# Patient Record
Sex: FEMALE | Race: BLACK OR AFRICAN AMERICAN | Employment: FULL TIME | URBAN - METROPOLITAN AREA
[De-identification: names, ages, dates, MRNs, and addresses within clinical notes are randomized per-mention and may not be internally consistent; named-entity substitution may affect disease eponyms.]

---

## 2024-01-04 ENCOUNTER — APPOINTMENT (OUTPATIENT)
Dept: OBSTETRICS AND GYNECOLOGY | Facility: CLINIC | Age: 39
End: 2024-01-04
Payer: COMMERCIAL

## 2024-02-02 ENCOUNTER — LAB (OUTPATIENT)
Dept: LAB | Facility: LAB | Age: 39
End: 2024-02-02
Payer: COMMERCIAL

## 2024-02-02 ENCOUNTER — OFFICE VISIT (OUTPATIENT)
Dept: OBSTETRICS AND GYNECOLOGY | Facility: CLINIC | Age: 39
End: 2024-02-02
Payer: COMMERCIAL

## 2024-02-02 VITALS
WEIGHT: 190 LBS | BODY MASS INDEX: 31.65 KG/M2 | DIASTOLIC BLOOD PRESSURE: 70 MMHG | HEIGHT: 65 IN | SYSTOLIC BLOOD PRESSURE: 114 MMHG | HEART RATE: 56 BPM

## 2024-02-02 DIAGNOSIS — Z00.00 HEALTHCARE MAINTENANCE: ICD-10-CM

## 2024-02-02 DIAGNOSIS — Z12.4 CERVICAL CANCER SCREENING: Primary | ICD-10-CM

## 2024-02-02 LAB
25(OH)D3 SERPL-MCNC: 14 NG/ML (ref 30–100)
ANION GAP SERPL CALC-SCNC: 11 MMOL/L (ref 10–20)
BUN SERPL-MCNC: 10 MG/DL (ref 6–23)
CALCIUM SERPL-MCNC: 9.1 MG/DL (ref 8.6–10.6)
CHLORIDE SERPL-SCNC: 108 MMOL/L (ref 98–107)
CHOLEST SERPL-MCNC: 143 MG/DL (ref 0–199)
CHOLESTEROL/HDL RATIO: 3.1
CO2 SERPL-SCNC: 26 MMOL/L (ref 21–32)
CREAT SERPL-MCNC: 0.79 MG/DL (ref 0.5–1.05)
EGFRCR SERPLBLD CKD-EPI 2021: >90 ML/MIN/1.73M*2
ERYTHROCYTE [DISTWIDTH] IN BLOOD BY AUTOMATED COUNT: 14.2 % (ref 11.5–14.5)
EST. AVERAGE GLUCOSE BLD GHB EST-MCNC: 111 MG/DL
GLUCOSE SERPL-MCNC: 84 MG/DL (ref 74–99)
HBA1C MFR BLD: 5.5 %
HCT VFR BLD AUTO: 40.5 % (ref 36–46)
HDLC SERPL-MCNC: 46.8 MG/DL
HGB BLD-MCNC: 13.5 G/DL (ref 12–16)
IRON SATN MFR SERPL: 22 % (ref 25–45)
IRON SERPL-MCNC: 75 UG/DL (ref 35–150)
MCH RBC QN AUTO: 28.5 PG (ref 26–34)
MCHC RBC AUTO-ENTMCNC: 33.3 G/DL (ref 32–36)
MCV RBC AUTO: 86 FL (ref 80–100)
NON-HDL CHOLESTEROL: 96 MG/DL (ref 0–149)
NRBC BLD-RTO: 0 /100 WBCS (ref 0–0)
PLATELET # BLD AUTO: 318 X10*3/UL (ref 150–450)
POTASSIUM SERPL-SCNC: 4.2 MMOL/L (ref 3.5–5.3)
RBC # BLD AUTO: 4.73 X10*6/UL (ref 4–5.2)
SODIUM SERPL-SCNC: 141 MMOL/L (ref 136–145)
TIBC SERPL-MCNC: 346 UG/DL (ref 240–445)
UIBC SERPL-MCNC: 271 UG/DL (ref 110–370)
WBC # BLD AUTO: 6 X10*3/UL (ref 4.4–11.3)

## 2024-02-02 PROCEDURE — 85027 COMPLETE CBC AUTOMATED: CPT

## 2024-02-02 PROCEDURE — 99214 OFFICE O/P EST MOD 30 MIN: CPT | Performed by: STUDENT IN AN ORGANIZED HEALTH CARE EDUCATION/TRAINING PROGRAM

## 2024-02-02 PROCEDURE — 36415 COLL VENOUS BLD VENIPUNCTURE: CPT

## 2024-02-02 PROCEDURE — 1036F TOBACCO NON-USER: CPT | Performed by: STUDENT IN AN ORGANIZED HEALTH CARE EDUCATION/TRAINING PROGRAM

## 2024-02-02 PROCEDURE — 82465 ASSAY BLD/SERUM CHOLESTEROL: CPT

## 2024-02-02 PROCEDURE — 88141 CYTOPATH C/V INTERPRET: CPT | Performed by: PATHOLOGY

## 2024-02-02 PROCEDURE — 87800 DETECT AGNT MULT DNA DIREC: CPT | Performed by: STUDENT IN AN ORGANIZED HEALTH CARE EDUCATION/TRAINING PROGRAM

## 2024-02-02 PROCEDURE — 83036 HEMOGLOBIN GLYCOSYLATED A1C: CPT

## 2024-02-02 PROCEDURE — 99385 PREV VISIT NEW AGE 18-39: CPT | Performed by: STUDENT IN AN ORGANIZED HEALTH CARE EDUCATION/TRAINING PROGRAM

## 2024-02-02 PROCEDURE — 87661 TRICHOMONAS VAGINALIS AMPLIF: CPT | Mod: 59 | Performed by: STUDENT IN AN ORGANIZED HEALTH CARE EDUCATION/TRAINING PROGRAM

## 2024-02-02 PROCEDURE — 80048 BASIC METABOLIC PNL TOTAL CA: CPT

## 2024-02-02 PROCEDURE — 82306 VITAMIN D 25 HYDROXY: CPT

## 2024-02-02 PROCEDURE — 87624 HPV HI-RISK TYP POOLED RSLT: CPT | Performed by: STUDENT IN AN ORGANIZED HEALTH CARE EDUCATION/TRAINING PROGRAM

## 2024-02-02 PROCEDURE — 88142 CYTOPATH C/V THIN LAYER: CPT | Mod: TC,GCY | Performed by: STUDENT IN AN ORGANIZED HEALTH CARE EDUCATION/TRAINING PROGRAM

## 2024-02-02 PROCEDURE — 83550 IRON BINDING TEST: CPT

## 2024-02-02 PROCEDURE — 83718 ASSAY OF LIPOPROTEIN: CPT

## 2024-02-02 PROCEDURE — 83540 ASSAY OF IRON: CPT

## 2024-02-02 ASSESSMENT — PAIN SCALES - GENERAL: PAINLEVEL: 0-NO PAIN

## 2024-02-02 NOTE — PROGRESS NOTES
"Division of Minimally Invasive Gynecologic Surgery  [facility]    02/02/24 Gynecology Consult     HISTORY OF PRESENT ILLNESS:  Mateo Webertimore 38 y.o. 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  - Denies any verbal or physical abuse     PAST MEDICAL HISTORY:  Past Medical History:   Diagnosis Date    IUD (intrauterine device) in place        PAST SURGICAL HISTORY:  Past Surgical History:   Procedure Laterality Date    BELT ABDOMINOPLASTY      BREAST SURGERY      Reduction    CHOLECYSTECTOMY  03/26/2014    laparoscopic     PHYSICAL EXAMINATION:  VITAL SIGNS: /70   Pulse 56   Ht 1.651 m (5' 5\")   Wt 86.2 kg (190 lb)   LMP 01/07/2024 (Exact Date)   BMI 31.62 kg/m²      Constitutional:  No acute distress, well-nourished and well-developed  HEENT: EOM grossly intact, MMM, neck supple and with full ROM  Pulm:  Effort normal. No accessory muscle usage.  No respiratory distress.  :  - EGBUS: grossly WNL  - Speculum: vaginal and cervical mucosa grossly WNL, no trauma or lesions  Neurological:  She is alert and oriented to person place and time.  Skin: Warm, no pallor.  Psychiatric:  She has normal mood and affect.    ASSESSMENT:  Mateo BISHOP Neli 38 y.o. presents for annual  - Pap collected  - Labs ordered   - GC/CT/trich from pap       Candis Blancas MD  02/02/24  10:20 AM   "

## 2024-02-05 LAB
C TRACH RRNA SPEC QL NAA+PROBE: NEGATIVE
N GONORRHOEA DNA SPEC QL PROBE+SIG AMP: NEGATIVE
T VAGINALIS RRNA SPEC QL NAA+PROBE: NEGATIVE

## 2024-02-15 DIAGNOSIS — E55.9 VITAMIN D DEFICIENCY: Primary | ICD-10-CM

## 2024-02-15 RX ORDER — ASCORBIC ACID 125 MG
25 TABLET,CHEWABLE ORAL DAILY
Qty: 90 EACH | Refills: 3 | Status: SHIPPED | OUTPATIENT
Start: 2024-02-15

## 2024-02-20 LAB
CYTOLOGY CMNT CVX/VAG CYTO-IMP: NORMAL
HPV HR GENOTYPES PNL CVX NAA+PROBE: POSITIVE
LAB AP HPV GENOTYPE QUESTION: NO
LAB AP HPV HR: NORMAL
LAB AP PAP ADDITIONAL TESTS: NORMAL
LABORATORY COMMENT REPORT: NORMAL
LABORATORY COMMENT REPORT: NORMAL
LMP START DATE: NORMAL
PATH REPORT.TOTAL CANCER: NORMAL

## 2024-03-13 ENCOUNTER — TELEPHONE (OUTPATIENT)
Dept: RADIOLOGY | Facility: CLINIC | Age: 39
End: 2024-03-13
Payer: COMMERCIAL

## 2024-03-13 NOTE — TELEPHONE ENCOUNTER
Left  message  ----- Message from Katia Carson RN sent at 3/12/2024  3:26 PM EDT -----    ----- Message -----  From: Candis Blancas MD  Sent: 3/4/2024   5:08 PM EDT  To: Katia Carson RN    Hi! [facility] Thanks!

## 2024-03-14 ENCOUNTER — TELEPHONE (OUTPATIENT)
Dept: OBSTETRICS AND GYNECOLOGY | Facility: CLINIC | Age: 39
End: 2024-03-14
Payer: COMMERCIAL

## 2024-03-14 NOTE — TELEPHONE ENCOUNTER
Patient advised of atypical cells on pap smear and positive HPV with explanation given.  Patient for colposcopy.  Procedure explained.  Appointment at [facility] given for 3/27/24.

## 2024-03-14 NOTE — TELEPHONE ENCOUNTER
----- Message from Katia Carson RN sent at 3/12/2024  3:26 PM EDT -----    ----- Message -----  From: Candis Blancas MD  Sent: 3/4/2024   5:08 PM EDT  To: Katia Carson RN    Hi! [facility] Thanks!

## 2024-03-18 ENCOUNTER — TELEPHONE (OUTPATIENT)
Dept: PRIMARY CARE | Facility: CLINIC | Age: 39
End: 2024-03-18
Payer: COMMERCIAL

## 2024-03-27 ENCOUNTER — PROCEDURE VISIT (OUTPATIENT)
Dept: OBSTETRICS AND GYNECOLOGY | Facility: HOSPITAL | Age: 39
End: 2024-03-27
Payer: COMMERCIAL

## 2024-03-27 VITALS
SYSTOLIC BLOOD PRESSURE: 126 MMHG | WEIGHT: 187 LBS | BODY MASS INDEX: 31.16 KG/M2 | HEIGHT: 65 IN | DIASTOLIC BLOOD PRESSURE: 71 MMHG

## 2024-03-27 DIAGNOSIS — R87.810 ASCUS WITH POSITIVE HIGH RISK HPV CERVICAL: Primary | ICD-10-CM

## 2024-03-27 DIAGNOSIS — R87.610 ASCUS WITH POSITIVE HIGH RISK HPV CERVICAL: Primary | ICD-10-CM

## 2024-03-27 LAB — PREGNANCY TEST URINE, POC: NEGATIVE

## 2024-03-27 PROCEDURE — 81025 URINE PREGNANCY TEST: CPT | Performed by: OBSTETRICS & GYNECOLOGY

## 2024-03-27 PROCEDURE — 57455 BIOPSY OF CERVIX W/SCOPE: CPT | Performed by: STUDENT IN AN ORGANIZED HEALTH CARE EDUCATION/TRAINING PROGRAM

## 2024-03-27 PROCEDURE — 88305 TISSUE EXAM BY PATHOLOGIST: CPT | Performed by: PATHOLOGY

## 2024-03-27 PROCEDURE — 88305 TISSUE EXAM BY PATHOLOGIST: CPT | Mod: TC,SUR | Performed by: OBSTETRICS & GYNECOLOGY

## 2024-03-27 PROCEDURE — 57455 BIOPSY OF CERVIX W/SCOPE: CPT | Mod: GC | Performed by: STUDENT IN AN ORGANIZED HEALTH CARE EDUCATION/TRAINING PROGRAM

## 2024-03-27 PROCEDURE — 81025 URINE PREGNANCY TEST: CPT

## 2024-03-27 NOTE — PROGRESS NOTES
Colposcopy    Date/Time: 3/27/2024 2:42 PM    Performed by: Charley Carolina MD  Authorized by: Roro Rasmussen MD    Consent:     Patient questions answered: yes      Risks and benefits of the procedure and its alternatives discussed: yes      Consent obtained:  Written    Consent given by:  Patient  Indication:     Other indication(s): clinical abnormality    Pre-procedure:     Prep solution(s): acetic acid    Procedure:     Colposcopy with: cervical biopsy      Cervix visibility: fully visualized      SCJ visibility: fully visualized      Acetowhite lesion(s): cervix      Acetowhite lesion(s) description:  12 oclock  Post-procedure:     Patient tolerance of procedure:  Patient tolerated the procedure well with no immediate complications          - Cervical biopsy @ noon, will follow up results   - Randy index 1 for acetowhite change, likely JUAN A 1   - Considering gardasil, undecided    Charley Carolina MD

## 2024-04-02 LAB
LABORATORY COMMENT REPORT: NORMAL
PATH REPORT.FINAL DX SPEC: NORMAL
PATH REPORT.GROSS SPEC: NORMAL
PATH REPORT.RELEVANT HX SPEC: NORMAL
PATH REPORT.TOTAL CANCER: NORMAL

## 2024-04-03 ENCOUNTER — TELEPHONE (OUTPATIENT)
Dept: OBSTETRICS AND GYNECOLOGY | Facility: HOSPITAL | Age: 39
End: 2024-04-03
Payer: COMMERCIAL

## 2024-04-03 NOTE — TELEPHONE ENCOUNTER
Result Communication    Resulted Orders   POCT pregnancy, urine manually resulted   Result Value Ref Range    Preg Test, Ur Negative Negative   Surgical Pathology Exam   Result Value Ref Range    Case Report       Surgical Pathology                                Case: [id number]                                  Authorizing Provider:  Roro Rasmussen MD   Collected:           2024 1502              [facility]       Received:            2024 1502                                     Hospital                                                                     Pathologist:           Jessica Amezcua DO                                                        Specimen:    CERVIX BIOPSY                                                                              FINAL DIAGNOSIS       A. CERVIX, 12 O'CLOCK, BIOPSY:   -- SQUAMOUS AND ENDOCERVICAL MUCOSA WITH REACTIVE CHANGE IN THE BACKGROUND OF ACUTE AND CHRONIC INFLAMMATION.                 By the signature on this report, the individual or group listed as making the Final Interpretation/Diagnosis certifies that they have reviewed this case.       Clinical History       abnormal pap      Gross Description       Received in formalin, labeled with the patient's name and hospital number, is a fragment of pink-tan mucosa measuring 0.6 x 0.3 x 0.1 cm. The apparent deep surface is inked blue. The specimen is submitted in toto in one cassette.  [facility]       Verified patient by name and .  RN placed call to inform patient of her colposcopy results and provider recommendation   Patient informed that her colposcopy biopsy was negative.  Dr. Rasmussen recommends repeat pap with HPV in 1 year, will place reminder call in system.   Patient verbalized understanding and denies any further questions or concerns.  Results were successfully communicated with the patient and they acknowledged their understanding.

## 2024-04-03 NOTE — TELEPHONE ENCOUNTER
----- Message from Roro Rasmussen MD sent at 4/2/2024  7:27 PM EDT -----  Biopsy negative. Follow-up Pap with HPV in 1 year.

## 2024-04-29 ENCOUNTER — APPOINTMENT (OUTPATIENT)
Dept: OBSTETRICS AND GYNECOLOGY | Facility: CLINIC | Age: 39
End: 2024-04-29
Payer: COMMERCIAL

## 2024-10-25 ENCOUNTER — APPOINTMENT (OUTPATIENT)
Dept: PRIMARY CARE | Facility: CLINIC | Age: 39
End: 2024-10-25
Payer: COMMERCIAL

## 2024-10-30 ENCOUNTER — APPOINTMENT (OUTPATIENT)
Dept: PRIMARY CARE | Facility: CLINIC | Age: 39
End: 2024-10-30
Payer: COMMERCIAL

## 2024-10-30 PROBLEM — N76.0 BACTERIAL VAGINOSIS: Status: RESOLVED | Noted: 2024-10-30 | Resolved: 2024-10-30

## 2024-10-30 PROBLEM — E55.9 VITAMIN D DEFICIENCY: Status: ACTIVE | Noted: 2024-10-30

## 2024-10-30 PROBLEM — R68.89 COLD INTOLERANCE: Status: RESOLVED | Noted: 2024-10-30 | Resolved: 2024-10-30

## 2024-10-30 PROBLEM — E04.9 ENLARGED THYROID GLAND: Status: ACTIVE | Noted: 2024-10-30

## 2024-10-30 PROBLEM — N76.0 ACUTE VAGINITIS: Status: RESOLVED | Noted: 2024-10-30 | Resolved: 2024-10-30

## 2024-10-30 PROBLEM — D21.9 FIBROID: Status: ACTIVE | Noted: 2024-10-30

## 2024-10-30 PROBLEM — H60.543 ECZEMA OF BOTH EXTERNAL EARS: Status: ACTIVE | Noted: 2024-10-30

## 2024-10-30 PROBLEM — E04.9 GOITER: Status: ACTIVE | Noted: 2024-10-30

## 2024-10-30 PROBLEM — B96.89 BACTERIAL VAGINOSIS: Status: RESOLVED | Noted: 2024-10-30 | Resolved: 2024-10-30

## 2024-10-30 PROBLEM — D21.9 LEIOMYOMA: Status: ACTIVE | Noted: 2024-10-30

## 2024-10-30 PROBLEM — A59.00 UROGENITAL INFECTION BY TRICHOMONAS VAGINALIS: Status: RESOLVED | Noted: 2024-10-30 | Resolved: 2024-10-30

## 2024-10-30 PROBLEM — E66.811 OBESITY (BMI 30.0-34.9): Status: ACTIVE | Noted: 2021-07-12

## 2025-04-02 ENCOUNTER — TELEPHONE (OUTPATIENT)
Dept: OBSTETRICS AND GYNECOLOGY | Facility: HOSPITAL | Age: 40
End: 2025-04-02
Payer: COMMERCIAL

## 2025-04-02 NOTE — TELEPHONE ENCOUNTER
----- Message from Roro Rasmussen sent at 4/2/2024  7:27 PM EDT -----  Biopsy negative. Follow-up Pap with HPV in 1 year.

## 2025-04-02 NOTE — TELEPHONE ENCOUNTER
Nurse called patient's listed telephone number in attempt to schedule patient's follow up pap but received the voicemail and left a message at [phone] asking for a call back at the office.    CECILLE Logan

## 2025-04-04 ENCOUNTER — TELEPHONE (OUTPATIENT)
Dept: OBSTETRICS AND GYNECOLOGY | Facility: HOSPITAL | Age: 40
End: 2025-04-04
Payer: COMMERCIAL

## 2025-04-04 NOTE — TELEPHONE ENCOUNTER
Patient verified name and date of birth at start of call. Nurse got patient scheduled for follow up pap w/hpv. Patient verbalized understanding and had no further questions, comments or concerns at this time.    CECILLE Logan